# Patient Record
Sex: MALE | Race: WHITE | NOT HISPANIC OR LATINO | Employment: OTHER | URBAN - METROPOLITAN AREA
[De-identification: names, ages, dates, MRNs, and addresses within clinical notes are randomized per-mention and may not be internally consistent; named-entity substitution may affect disease eponyms.]

---

## 2017-03-17 ENCOUNTER — DOCUMENTATION ONLY (OUTPATIENT)
Dept: LAB | Facility: CLINIC | Age: 62
End: 2017-03-17

## 2017-03-17 NOTE — PROGRESS NOTES
.Pt has upcoming lab appointment on 3/22/17. Per lab scrubbing protocol pt is not due for any lab test at this time. Please review chart, if nothing is needed please contact pt and cancel the appointment.  Thank you,  Elsa

## 2017-03-17 NOTE — PROGRESS NOTES
Patient called back and I told him he does not needs labs. He asked about a PSA and I told him to ask Dr Piña when he is in and it can be drawn the day of his physical if needed.Charleen Edward MA/TEODORO

## 2017-03-17 NOTE — PROGRESS NOTES
Left message to call back.  Physical 3/28/17-lab appointment not needed-labs were normal last year  Charleen Edward MA/TC

## 2017-03-28 ENCOUNTER — OFFICE VISIT (OUTPATIENT)
Dept: FAMILY MEDICINE | Facility: CLINIC | Age: 62
End: 2017-03-28
Payer: COMMERCIAL

## 2017-03-28 VITALS
WEIGHT: 196 LBS | BODY MASS INDEX: 29.03 KG/M2 | HEIGHT: 69 IN | DIASTOLIC BLOOD PRESSURE: 76 MMHG | SYSTOLIC BLOOD PRESSURE: 146 MMHG | HEART RATE: 67 BPM | TEMPERATURE: 97.4 F

## 2017-03-28 DIAGNOSIS — Z00.00 ROUTINE GENERAL MEDICAL EXAMINATION AT A HEALTH CARE FACILITY: Primary | ICD-10-CM

## 2017-03-28 DIAGNOSIS — K63.5 SESSILE COLONIC POLYP: ICD-10-CM

## 2017-03-28 DIAGNOSIS — Z12.5 SCREENING FOR PROSTATE CANCER: ICD-10-CM

## 2017-03-28 LAB — PSA SERPL-ACNC: 0.44 UG/L (ref 0–4)

## 2017-03-28 PROCEDURE — G0103 PSA SCREENING: HCPCS | Performed by: FAMILY MEDICINE

## 2017-03-28 PROCEDURE — 99396 PREV VISIT EST AGE 40-64: CPT | Performed by: FAMILY MEDICINE

## 2017-03-28 NOTE — PROGRESS NOTES
SUBJECTIVE:     CC: Gregory Beckham is an 62 year old male who presents for preventative health visit.     Healthy Habits:    Do you get at least three servings of calcium containing foods daily (dairy, green leafy vegetables, etc.)? yes    Amount of exercise or daily activities, outside of work: 4 day(s) per week    Problems taking medications regularly not applicable    Medication side effects: No    Have you had an eye exam in the past two years? no    Do you see a dentist twice per year? yes    Do you have sleep apnea, excessive snoring or daytime drowsiness?no        Would like to discuss colonoscopy results from last year    Today's PHQ-2 Score:   PHQ-2 ( 1999 Pfizer) 3/28/2017 3/9/2016   Q1: Little interest or pleasure in doing things 0 0   Q2: Feeling down, depressed or hopeless 0 0   PHQ-2 Score 0 0       Abuse: Current or Past(Physical, Sexual or Emotional)- No  Do you feel safe in your environment - Yes    Social History   Substance Use Topics     Smoking status: Never Smoker     Smokeless tobacco: Never Used     Alcohol use No     The patient does not drink >3 drinks per day nor >7 drinks per week.    Last PSA:   PSA   Date Value Ref Range Status   03/09/2016 0.42 0 - 4 ug/L Final       Recent Labs   Lab Test  03/09/16   0857 03/29/12   CHOL  147  152   HDL  42  37   LDL  93  93   TRIG  62  112   NHDL  105   --        Reviewed orders with patient. Reviewed health maintenance and updated orders accordingly - Yes    Family history of prostate cancer: No  Last PSA:   PSA   Date Value Ref Range Status   03/09/2016 0.42 0 - 4 ug/L Final     Doing self testicular exam: NO     Family history of colon cancer:No  Last colonscopy: 2015     Family history of CAD:No             Immunizations:    Date last DT 2008,  Date last Pneumovax NA,   Date last Flu NA      Seat Belt:YES   Sunscreen use: YES  Calcium Intake: adeq  Health Care Directive:NO   Sexually Active:NO      Current contraception: none     Current  Concerns: due for colonoscopy due to sessile polyps noted last year          Patient Ed:  Reviewed health maintenance including diet, regular exercise, and periodic exams.     The risks, benefits, and treatment options of prescribed medications or other treatments have been discussed with the patient.  The patient should call or schedule a follow up appt if no improvement or other problems.    Reviewed and updated as needed this visit by clinical staff  Tobacco  Allergies  Meds  Problems  Med Hx  Surg Hx  Fam Hx  Soc Hx          Reviewed and updated as needed this visit by Provider  Allergies  Meds  Problems            ROS:  C: NEGATIVE for fever, chills, change in weight  I: NEGATIVE for worrisome rashes, moles or lesions  E: NEGATIVE for vision changes or irritation  ENT: NEGATIVE for ear, mouth and throat problems  R: NEGATIVE for significant cough or SOB  CV: NEGATIVE for chest pain, palpitations or peripheral edema  GI: NEGATIVE for nausea, abdominal pain, heartburn, or change in bowel habits   male: negative for dysuria, hematuria, decreased urinary stream, erectile dysfunction, urethral discharge  M: NEGATIVE for significant arthralgias or myalgia  N: NEGATIVE for weakness, dizziness or paresthesias  P: NEGATIVE for changes in mood or affect    Problem list, Medication list, Allergies, and Medical/Social/Surgical histories reviewed in [facility] and updated as appropriate.  Labs reviewed in EPIC  BP Readings from Last 3 Encounters:   03/28/17 146/76   03/29/16 126/82   03/09/16 135/79    Wt Readings from Last 3 Encounters:   03/28/17 196 lb (88.9 kg)   03/22/16 188 lb (85.3 kg)   03/09/16 197 lb (89.4 kg)                  Patient Active Problem List   Diagnosis     Advanced directives, counseling/discussion     CARDIOVASCULAR SCREENING; LDL GOAL LESS THAN 160     Sessile colonic polyp     Past Surgical History:   Procedure Laterality Date     BACK SURGERY  1976    cyst on spine      COLONOSCOPY WITH CO2  "INSUFFLATION N/A 3/29/2016    Procedure: COLONOSCOPY WITH CO2 INSUFFLATION;  Surgeon: Jose Alberto Christy MD;  Location: [facility] OR     GENITOURINARY SURGERY      undescened testicle     HEMORRHOIDECTOMY  1996       Social History   Substance Use Topics     Smoking status: Never Smoker     Smokeless tobacco: Never Used     Alcohol use No     Family History   Problem Relation Age of Onset     Alzheimer Disease Mother      Alcohol/Drug Father      CEREBROVASCULAR DISEASE Father      CEREBROVASCULAR DISEASE Paternal Grandfather          Current Outpatient Prescriptions   Medication Sig Dispense Refill     VITAMIN A PO        vitamin  B complex with vitamin C (VITAMIN  B COMPLEX) TABS Take 1 tablet by mouth daily       Ascorbic Acid (VITAMIN C PO)        Cholecalciferol (VITAMIN D3 PO) Take by mouth daily       aspirin 81 MG tablet Take by mouth daily       OBJECTIVE:     /76  Pulse 67  Temp 97.4  F (36.3  C) (Oral)  Ht 5' 8.5\" (1.74 m)  Wt 196 lb (88.9 kg)  BMI 29.36 kg/m2  EXAM:  GENERAL: healthy, alert and no distress  EYES: Eyes grossly normal to inspection, PERRL and conjunctivae and sclerae normal  HENT: ear canals and TM's normal, nose and mouth without ulcers or lesions  NECK: no adenopathy, no asymmetry, masses, or scars and thyroid normal to palpation  RESP: lungs clear to auscultation - no rales, rhonchi or wheezes  CV: regular rate and rhythm, normal S1 S2, no S3 or S4, no murmur, click or rub, no peripheral edema and peripheral pulses strong  ABDOMEN: soft, nontender, no hepatosplenomegaly, no masses and bowel sounds normal   (male): normal male genitalia without lesions or urethral discharge, no hernia  MS: no gross musculoskeletal defects noted, no edema  SKIN: no suspicious lesions or rashes  NEURO: Normal strength and tone, mentation intact and speech normal  PSYCH: mentation appears normal, affect normal/bright    ASSESSMENT/PLAN:     (Z00.00) Routine general medical examination at [facility]" "care facility  (primary encounter diagnosis)  Comment: preventive needs reviewed  Plan: see orders in Epic.   Elevated bp, f/u for repeat testing in pharmacy in 2-4 weeks.  Consider med if still above 140/90, discussed rational for treatment with pt.    (K63.5) Sessile colonic polyp  Comment: due   Plan: GASTROENTEROLOGY ADULT REF PROCEDURE ONLY            (Z12.5) Screening for prostate cancer  Comment: normal last year but pt has relative that is dying of prostate cancer and would like reassurance  Plan: PSA, screen        If normal today, will delay next testing for 2-3 years.      COUNSELING:  Reviewed preventive health counseling, as reflected in patient instructions  Special attention given to:        Regular exercise       Healthy diet/nutrition       Prostate cancer screening    BP Screening:   Last 3 BP Readings:    BP Readings from Last 3 Encounters:   03/28/17 146/76   03/29/16 126/82   03/09/16 135/79       The following was recommended to the patient:  Re-screen within 4 weeks and recommend lifestyle modifications     reports that he has never smoked. He has never used smokeless tobacco.    Estimated body mass index is 29.36 kg/(m^2) as calculated from the following:    Height as of this encounter: 5' 8.5\" (1.74 m).    Weight as of this encounter: 196 lb (88.9 kg).   Weight management plan: Discussed healthy diet and exercise guidelines and patient will follow up in 6 months in clinic to re-evaluate.    Counseling Resources:  ATP IV Guidelines  Pooled Cohorts Equation Calculator  FRAX Risk Assessment  ICSI Preventive Guidelines  Dietary Guidelines for Americans, 2010  USDA's MyPlate  ASA Prophylaxis  Lung CA Screening    Gege Piña MD  [facility]  "

## 2017-03-28 NOTE — PATIENT INSTRUCTIONS
Expect a phone call to schedule colonoscopy.    Blood pressure check in 2 weeks, walk-in to [facility] and request report be sent to Gege Piña MD     Preventive Health Recommendations  Male Ages 50 - 64    Yearly exam:             See your health care provider every year in order to  o   Review health changes.   o   Discuss preventive care.    o   Review your medicines if your doctor has prescribed any.     Have a cholesterol test every 5 years, or more frequently if you are at risk for high cholesterol/heart disease.     Have a diabetes test (fasting glucose) every three years. If you are at risk for diabetes, you should have this test more often.     Have a colonoscopy at age 50, or have a yearly FIT test (stool test). These exams will check for colon cancer.      Talk with your health care provider about whether or not a prostate cancer screening test (PSA) is right for you.    You should be tested each year for STDs (sexually transmitted diseases), if you re at risk.     Shots: Get a flu shot each year. Get a tetanus shot every 10 years.     Nutrition:    Eat at least 5 servings of fruits and vegetables daily.     Eat whole-grain bread, whole-wheat pasta and brown rice instead of white grains and rice.     Talk to your provider about Calcium and Vitamin D.     Lifestyle    Exercise for at least 150 minutes a week (30 minutes a day, 5 days a week). This will help you control your weight and prevent disease.     Limit alcohol to one drink per day.     No smoking.     Wear sunscreen to prevent skin cancer.     See your dentist every six months for an exam and cleaning.     See your eye doctor every 1 to 2 years.    Preventive Health Recommendations  Male Ages 50 - 64    Yearly exam:             See your health care provider every year in order to  o   Review health changes.   o   Discuss preventive care.    o   Review your medicines if your doctor has prescribed any.     Have a cholesterol test every  5 years, or more frequently if you are at risk for high cholesterol/heart disease.     Have a diabetes test (fasting glucose) every three years. If you are at risk for diabetes, you should have this test more often.     Have a colonoscopy at age 50, or have a yearly FIT test (stool test). These exams will check for colon cancer.      Talk with your health care provider about whether or not a prostate cancer screening test (PSA) is right for you.    You should be tested each year for STDs (sexually transmitted diseases), if you re at risk.     Shots: Get a flu shot each year. Get a tetanus shot every 10 years.     Nutrition:    Eat at least 5 servings of fruits and vegetables daily.     Eat whole-grain bread, whole-wheat pasta and brown rice instead of white grains and rice.     Talk to your provider about Calcium and Vitamin D.     Lifestyle    Exercise for at least 150 minutes a week (30 minutes a day, 5 days a week). This will help you control your weight and prevent disease.     Limit alcohol to one drink per day.     No smoking.     Wear sunscreen to prevent skin cancer.     See your dentist every six months for an exam and cleaning.     See your eye doctor every 1 to 2 years.

## 2017-03-28 NOTE — PROGRESS NOTES
"  SUBJECTIVE:     CC: Gregory Beckham is an 62 year old male who presents for preventative health visit.     Healthy Habits:    Do you get at least three servings of calcium containing foods daily (dairy, green leafy vegetables, etc.)? {YES/NO, DAIRY INTAKE:168417::\"yes\"}    Amount of exercise or daily activities, outside of work: {AMOUNT EXERCISE:467903}    Problems taking medications regularly {Yes /No default:762596::\"No\"}    Medication side effects: {Yes /No default.:220232::\"No\"}    Have you had an eye exam in the past two years? {YESNOBLANK:590322}    Do you see a dentist twice per year? {YESNOBLANK:662476}    Do you have sleep apnea, excessive snoring or daytime drowsiness?{YESNOBLANK:776403}    {Outside tests to abstract? :219505}    {additional problems to add:559970}    Today's PHQ-2 Score:   PHQ-2 ( 1999 Pfizer) 3/9/2016 7/23/2014   Q1: Little interest or pleasure in doing things 0 0   Q2: Feeling down, depressed or hopeless 0 0   PHQ-2 Score 0 0     {PHQ-2 LOOK IN ASSESSMENTS :598010}  Abuse: Current or Past(Physical, Sexual or Emotional)- {YES/NO/NA:296772}  Do you feel safe in your environment - {YES/NO/NA:140658}    Social History   Substance Use Topics     Smoking status: Never Smoker     Smokeless tobacco: Never Used     Alcohol use No     {ETOH AUDIT:518352}    Last PSA:   PSA   Date Value Ref Range Status   03/09/2016 0.42 0 - 4 ug/L Final       Recent Labs   Lab Test  03/09/16   0857 03/29/12   CHOL  147  152   HDL  42  37   LDL  93  93   TRIG  62  112   NHDL  105   --        Reviewed orders with patient. Reviewed health maintenance and updated orders accordingly - {Yes/No:339219::\"Yes\"}      Family history of prostate cancer: No  Last PSA:   PSA   Date Value Ref Range Status   03/09/2016 0.42 0 - 4 ug/L Final     Doing self testicular exam: NO     Family history of colon cancer:No  Last colonscopy: 3/16     Family history of CAD:No       Immunizations:    Date last DT tdap 2008,  Date last " "Pneumovax NA   Date last Flu due       Seat Belt:{YES/NO (DEFAULT IS YES):146395::\"YES\"}   Sunscreen use: {YES/NO (DEFAULT IS YES):179362::\"YES\"}  Calcium Intake: adeq   Health Care Directive:NO   Sexually Active:NO      Current contraception: none     Current Concerns: ***          Patient Ed:  Reviewed health maintenance including diet, regular exercise, and periodic exams.     The risks, benefits, and treatment options of prescribed medications or other treatments have been discussed with the patient.  The patient should call or schedule a follow up appt if no improvement or other problems.    Reviewed and updated as needed this visit by clinical staff         Reviewed and updated as needed this visit by Provider        {HISTORY OPTIONS:855943}    ROS:  {MALE ROS-adult preventive care package:923308::\"C: NEGATIVE for fever, chills, change in weight\",\"I: NEGATIVE for worrisome rashes, moles or lesions\",\"E: NEGATIVE for vision changes or irritation\",\"ENT: NEGATIVE for ear, mouth and throat problems\",\"R: NEGATIVE for significant cough or SOB\",\"CV: NEGATIVE for chest pain, palpitations or peripheral edema\",\"GI: NEGATIVE for nausea, abdominal pain, heartburn, or change in bowel habits\",\" male: negative for dysuria, hematuria, decreased urinary stream, erectile dysfunction, urethral discharge\",\"M: NEGATIVE for significant arthralgias or myalgia\",\"N: NEGATIVE for weakness, dizziness or paresthesias\",\"P: NEGATIVE for changes in mood or affect\"}    {CHRONICPROBDATA:282561}  OBJECTIVE:     There were no vitals taken for this visit.  EXAM:  {Exam Choices:747504}    ASSESSMENT/PLAN:     {Diag Picklist:984626}    COUNSELING:  {MALE COUNSELING MESSAGES:979598::\"Reviewed preventive health counseling, as reflected in patient instructions\"}    {Blood Pressure - Adult Preventive:174953}     reports that he has never smoked. He has never used smokeless tobacco.  {Tobacco Cessation needed for ACO -- Delete if patient is a " "non-smoker:392807}  Estimated body mass index is 28.59 kg/(m^2) as calculated from the following:    Height as of 3/22/16: 5' 8\" (1.727 m).    Weight as of 3/22/16: 188 lb (85.3 kg).   {Weight Management Plan needed for ACO:453923}    Counseling Resources:  ATP IV Guidelines  Pooled Cohorts Equation Calculator  FRAX Risk Assessment  ICSI Preventive Guidelines  Dietary Guidelines for Americans, 2010  USDA's MyPlate  ASA Prophylaxis  Lung CA Screening    Gege Piña MD  [facility]  "

## 2017-03-28 NOTE — LETTER
[facility]  [address] [address] [phone]        March 28, 2017    Gregory Beckham  [address] [address]            Dear Gregory,    Your prostate testing is nearly unchanged from 1 year ago.  I suggest not checking again for about 3 years or if you have symptoms.     Gege Piña MD/kinjal    Results for orders placed or performed in visit on 03/28/17   PSA, screen   Result Value Ref Range    PSA 0.44 0 - 4 ug/L

## 2017-03-28 NOTE — MR AVS SNAPSHOT
After Visit Summary   3/28/2017    Gregory Beckham    MRN: [id number]           Patient Information     Date Of Birth          1955        Visit Information        Provider Department      3/28/2017 7:35 AM Gege Piña MD [facility] [place]        Today's Diagnoses     Routine general medical examination at a health care facility    -  1    Sessile colonic polyp        Screening for prostate cancer          Care Instructions        Expect a phone call to schedule colonoscopy.    Blood pressure check in 2 weeks, walk-in to [facility] and request report be sent to Gege Piña MD     Preventive Health Recommendations  Male Ages 50 - 64    Yearly exam:             See your health care provider every year in order to  o   Review health changes.   o   Discuss preventive care.    o   Review your medicines if your doctor has prescribed any.     Have a cholesterol test every 5 years, or more frequently if you are at risk for high cholesterol/heart disease.     Have a diabetes test (fasting glucose) every three years. If you are at risk for diabetes, you should have this test more often.     Have a colonoscopy at age 50, or have a yearly FIT test (stool test). These exams will check for colon cancer.      Talk with your health care provider about whether or not a prostate cancer screening test (PSA) is right for you.    You should be tested each year for STDs (sexually transmitted diseases), if you re at risk.     Shots: Get a flu shot each year. Get a tetanus shot every 10 years.     Nutrition:    Eat at least 5 servings of fruits and vegetables daily.     Eat whole-grain bread, whole-wheat pasta and brown rice instead of white grains and rice.     Talk to your provider about Calcium and Vitamin D.     Lifestyle    Exercise for at least 150 minutes a week (30 minutes a day, 5 days a week). This will help you control your weight and prevent disease.     Limit alcohol to one drink  per day.     No smoking.     Wear sunscreen to prevent skin cancer.     See your dentist every six months for an exam and cleaning.     See your eye doctor every 1 to 2 years.    Preventive Health Recommendations  Male Ages 50 - 64    Yearly exam:             See your health care provider every year in order to  o   Review health changes.   o   Discuss preventive care.    o   Review your medicines if your doctor has prescribed any.     Have a cholesterol test every 5 years, or more frequently if you are at risk for high cholesterol/heart disease.     Have a diabetes test (fasting glucose) every three years. If you are at risk for diabetes, you should have this test more often.     Have a colonoscopy at age 50, or have a yearly FIT test (stool test). These exams will check for colon cancer.      Talk with your health care provider about whether or not a prostate cancer screening test (PSA) is right for you.    You should be tested each year for STDs (sexually transmitted diseases), if you re at risk.     Shots: Get a flu shot each year. Get a tetanus shot every 10 years.     Nutrition:    Eat at least 5 servings of fruits and vegetables daily.     Eat whole-grain bread, whole-wheat pasta and brown rice instead of white grains and rice.     Talk to your provider about Calcium and Vitamin D.     Lifestyle    Exercise for at least 150 minutes a week (30 minutes a day, 5 days a week). This will help you control your weight and prevent disease.     Limit alcohol to one drink per day.     No smoking.     Wear sunscreen to prevent skin cancer.     See your dentist every six months for an exam and cleaning.     See your eye doctor every 1 to 2 years.          Follow-ups after your visit        Additional Services     GASTROENTEROLOGY ADULT REF PROCEDURE ONLY       Last Lab Result: No results found for: CR  Body mass index is 29.36 kg/(m^2).     Needed:  No  Language:  English    Patient will be contacted to  "schedule procedure.     Please be aware that coverage of these services is subject to the terms and limitations of your health insurance plan.  Call member services at your health plan with any benefit or coverage questions.  Any procedures must be performed at a [facility] facility OR coordinated by your clinic's referral office.    Please bring the following with you to your appointment:    (1) Any X-Rays, CTs or MRIs which have been performed.  Contact the facility where they were done to arrange for  prior to your scheduled appointment.    (2) List of current medications   (3) This referral request   (4) Any documents/labs given to you for this referral                  Who to contact     If you have questions or need follow up information about today's clinic visit or your schedule please contact [facility] directly at [phone].  Normal or non-critical lab and imaging results will be communicated to you by MyChart, letter or phone within 4 business days after the clinic has received the results. If you do not hear from us within 7 days, please contact the clinic through MyChart or phone. If you have a critical or abnormal lab result, we will notify you by phone as soon as possible.  Submit refill requests through [employer] or call your pharmacy and they will forward the refill request to us. Please allow 3 business days for your refill to be completed.          Additional Information About Your Visit        [employer] Information     [employer] lets you send messages to your doctor, view your test results, renew your prescriptions, schedule appointments and more. To sign up, go to [email or web address] . Click on \"Log in\" on the left side of the screen, which will take you to the Welcome page. Then click on \"Sign up Now\" on the right side of the page.     You will be asked to enter the access code listed below, as well as some personal information. Please follow the directions to create your " "username and password.     Your access code is: [email or web address]  Expires: 2017  7:59 AM     Your access code will  in 90 days. If you need help or a new code, please call your [facility] clinic or [phone].        Care EveryWhere ID     This is your Care EveryWhere ID. This could be used by other organizations to access your [facility] medical records  [id number]        Your Vitals Were     Pulse Temperature Height BMI (Body Mass Index)          67 97.4  F (36.3  C) (Oral) 5' 8.5\" (1.74 m) 29.36 kg/m2         Blood Pressure from Last 3 Encounters:   17 154/89   16 126/82   16 135/79    Weight from Last 3 Encounters:   17 196 lb (88.9 kg)   16 188 lb (85.3 kg)   16 197 lb (89.4 kg)              We Performed the Following     GASTROENTEROLOGY ADULT REF PROCEDURE ONLY     PSA, screen        Primary Care Provider Office Phone # Fax #    [facility] [phone] [address] [facility]. [facility] [address]        Thank you!     Thank you for choosing [facility]  for your care. Our goal is always to provide you with excellent care. Hearing back from our patients is one way we can continue to improve our services. Please take a few minutes to complete the written survey that you may receive in the mail after your visit with us. Thank you!             Your Updated Medication List - Protect others around you: Learn how to safely use, store and throw away your medicines at www.disposemymeds.org.          This list is accurate as of: 3/28/17  7:59 AM.  Always use your most recent med list.                   Brand Name Dispense Instructions for use    aspirin 81 MG tablet      Take by mouth daily       VITAMIN A PO          vitamin B complex with vitamin C Tabs tablet      Take 1 tablet by mouth daily       VITAMIN C PO          VITAMIN D3 PO      Take by mouth daily         "

## 2017-03-28 NOTE — NURSING NOTE
"Chief Complaint   Patient presents with     Physical       Initial /89  Pulse 67  Temp 97.4  F (36.3  C) (Oral)  Ht 5' 8.5\" (1.74 m)  Wt 196 lb (88.9 kg)  BMI 29.36 kg/m2 Estimated body mass index is 29.36 kg/(m^2) as calculated from the following:    Height as of this encounter: 5' 8.5\" (1.74 m).    Weight as of this encounter: 196 lb (88.9 kg).  Medication Reconciliation: complete  [facility] , [place]     "

## 2017-04-17 ENCOUNTER — SURGERY (OUTPATIENT)
Age: 62
End: 2017-04-17

## 2017-04-17 ENCOUNTER — HOSPITAL ENCOUNTER (OUTPATIENT)
Facility: AMBULATORY SURGERY CENTER | Age: 62
Discharge: HOME | End: 2017-04-17
Attending: SURGERY | Admitting: SURGERY
Payer: COMMERCIAL

## 2017-04-17 VITALS
BODY MASS INDEX: 28.79 KG/M2 | TEMPERATURE: 98.9 F | HEIGHT: 68 IN | DIASTOLIC BLOOD PRESSURE: 84 MMHG | OXYGEN SATURATION: 96 % | SYSTOLIC BLOOD PRESSURE: 121 MMHG | RESPIRATION RATE: 14 BRPM | WEIGHT: 190 LBS

## 2017-04-17 DIAGNOSIS — K21.00 GASTROESOPHAGEAL REFLUX DISEASE WITH ESOPHAGITIS: Primary | ICD-10-CM

## 2017-04-17 LAB — COLONOSCOPY: NORMAL

## 2017-04-17 PROCEDURE — 45385 COLONOSCOPY W/LESION REMOVAL: CPT

## 2017-04-17 PROCEDURE — 45385 COLONOSCOPY W/LESION REMOVAL: CPT | Mod: PT | Performed by: SURGERY

## 2017-04-17 PROCEDURE — G8907 PT DOC NO EVENTS ON DISCHARG: HCPCS

## 2017-04-17 PROCEDURE — G8918 PT W/O PREOP ORDER IV AB PRO: HCPCS

## 2017-04-17 PROCEDURE — 88305 TISSUE EXAM BY PATHOLOGIST: CPT | Mod: TC | Performed by: SURGERY

## 2017-04-17 RX ORDER — FENTANYL CITRATE 50 UG/ML
INJECTION, SOLUTION INTRAMUSCULAR; INTRAVENOUS PRN
Status: DISCONTINUED | OUTPATIENT
Start: 2017-04-17 | End: 2017-04-17 | Stop reason: HOSPADM

## 2017-04-17 RX ORDER — PANTOPRAZOLE SODIUM 40 MG/1
40 TABLET, DELAYED RELEASE ORAL 2 TIMES DAILY
Qty: 90 TABLET | Refills: 1 | Status: SHIPPED | OUTPATIENT
Start: 2017-04-17 | End: 2017-04-17

## 2017-04-17 RX ORDER — ONDANSETRON 2 MG/ML
4 INJECTION INTRAMUSCULAR; INTRAVENOUS
Status: DISCONTINUED | OUTPATIENT
Start: 2017-04-17 | End: 2017-04-18 | Stop reason: HOSPADM

## 2017-04-17 RX ORDER — LIDOCAINE 40 MG/G
CREAM TOPICAL
Status: DISCONTINUED | OUTPATIENT
Start: 2017-04-17 | End: 2017-04-18 | Stop reason: HOSPADM

## 2017-04-17 RX ADMIN — FENTANYL CITRATE 50 MCG: 50 INJECTION, SOLUTION INTRAMUSCULAR; INTRAVENOUS at 09:54

## 2017-04-17 RX ADMIN — FENTANYL CITRATE 50 MCG: 50 INJECTION, SOLUTION INTRAMUSCULAR; INTRAVENOUS at 09:56

## 2017-04-17 RX ADMIN — FENTANYL CITRATE 100 MCG: 50 INJECTION, SOLUTION INTRAMUSCULAR; INTRAVENOUS at 09:38

## 2017-04-20 LAB — COPATH REPORT: NORMAL

## 2024-07-05 ENCOUNTER — OFFICE VISIT (OUTPATIENT)
Dept: FAMILY MEDICINE | Facility: CLINIC | Age: 69
End: 2024-07-05
Payer: COMMERCIAL

## 2024-07-05 VITALS
RESPIRATION RATE: 16 BRPM | BODY MASS INDEX: 32.28 KG/M2 | SYSTOLIC BLOOD PRESSURE: 162 MMHG | HEIGHT: 68 IN | WEIGHT: 213 LBS | OXYGEN SATURATION: 98 % | DIASTOLIC BLOOD PRESSURE: 94 MMHG | TEMPERATURE: 97 F | HEART RATE: 73 BPM

## 2024-07-05 DIAGNOSIS — G45.9 TIA (TRANSIENT ISCHEMIC ATTACK): ICD-10-CM

## 2024-07-05 DIAGNOSIS — Z12.11 COLON CANCER SCREENING: ICD-10-CM

## 2024-07-05 DIAGNOSIS — I10 BENIGN ESSENTIAL HYPERTENSION: ICD-10-CM

## 2024-07-05 DIAGNOSIS — I65.21 STENOSIS OF RIGHT CAROTID ARTERY: ICD-10-CM

## 2024-07-05 DIAGNOSIS — Z09 HOSPITAL DISCHARGE FOLLOW-UP: Primary | ICD-10-CM

## 2024-07-05 PROCEDURE — 99204 OFFICE O/P NEW MOD 45 MIN: CPT | Performed by: FAMILY MEDICINE

## 2024-07-05 RX ORDER — VITAMIN E 268 MG
400 CAPSULE ORAL
COMMUNITY

## 2024-07-05 RX ORDER — CLOPIDOGREL BISULFATE 75 MG/1
75 TABLET ORAL
COMMUNITY
Start: 2024-07-01 | End: 2024-09-05

## 2024-07-05 RX ORDER — AMLODIPINE BESYLATE 5 MG/1
1 TABLET ORAL DAILY
COMMUNITY
Start: 2024-07-02 | End: 2024-07-05

## 2024-07-05 RX ORDER — ATORVASTATIN CALCIUM 40 MG/1
40 TABLET, FILM COATED ORAL DAILY
COMMUNITY
Start: 2024-07-01

## 2024-07-05 RX ORDER — AMLODIPINE BESYLATE 10 MG/1
10 TABLET ORAL DAILY
Qty: 90 TABLET | Refills: 1 | Status: SHIPPED | OUTPATIENT
Start: 2024-07-05

## 2024-07-05 ASSESSMENT — PAIN SCALES - GENERAL: PAINLEVEL: NO PAIN (0)

## 2024-07-05 NOTE — PROGRESS NOTES
"  Assessment & Plan     Hospital discharge follow-up  Gregory Beckham is a 69 year old male who presents today for follow-up after recent discharge from hospital.  The patient was admitted for TIA.  He was found to have severe right carotid artery stenosis status post right carotid endarterectomy.  Today he reports he is doing well.  He was discharged on Plavix, aspirin, and statin.  He will continue the same medication and follow-up with vascular surgery and neurology.  TIA (transient ischemic attack)  Continue statin, Lipitor and aspirin.  Follow-up with neurology as planned  Stenosis of right carotid artery  He was found to have severe right carotid artery stenosis status post right carotid endarterectomy  Benign essential hypertension  Blood pressures not well-controlled.  Will increase Norvasc to 10 mg.  - amLODIPine (NORVASC) 10 MG tablet; Take 1 tablet (10 mg) by mouth daily    Colon cancer screening    - Colonoscopy Screening  Referral; Future        MED REC REQUIRED  Post Medication Reconciliation Status: discharge medications reconciled and changed, per note/orders  BMI  Estimated body mass index is 32.39 kg/m  as calculated from the following:    Height as of this encounter: 1.727 m (5' 8\").    Weight as of this encounter: 96.6 kg (213 lb).   Weight management plan: Discussed healthy diet and exercise guidelines      Work on weight loss  Regular exercise    Fabien Jenkins is a 69 year old, presenting for the following health issues:  Hospital F/U        7/5/2024    10:42 AM   Additional Questions   Roomed by Noa HERNÁNDEZ         Hospital Follow-up Visit:    Hospital[facility]Nursing Home/ Rehab [facility]   Date of Admission: 6/28/24  Date of Discharge: 7/1/24  Reason(s) for Admission: TIA (transient ischemic attack) (Primary Dx);   Internal carotid artery stenosis, right;   HTN (hypertension)   Was the patient in the ICU or did the patient experience delirium during hospitalization?  " No  Do you have any other stressors you would like to discuss with your provider? No    Problems taking medications regularly:  None  Medication changes since discharge: None  Problems adhering to non-medication therapy:  None    Summary of hospitalization:  CareEverywhere information obtained and reviewed  Diagnostic Tests/Treatments reviewed.  Follow up needed: none  Other Healthcare Providers Involved in Patient s Care:         None  Update since discharge: improved.         Plan of care communicated with patient         Hospital Summary     Gregory Beckham is a(n) 69 y.o. , who was admitted on 2024 with right eye vision changes and dizziness due to transient ischemic attack. Stroke Neurology called from emergency department . No medical care in 4 years and his wife  3 years ago. Patient having intermittent issues feeling like a dark shade is being pulled down on his eye did see an eye professional workup was negative he was recommended to come to the ED this was a few days ago now presents today. He was told at the eye provider that he had low blood flow to the right eye. Patient had an MRI showing carotid stenosis. Patient currently saying symptoms are improved but he has been feeling dizzy with ambulation. Neurology was consulted from the emergency room anticoagulation plan per them.      CAT neck :  FINDINGS:   RIGHT CAROTID: 90% stenosis of the proximal right internal carotid artery is present. Lesion occurs less than 4 mm distal to the ICA origin. Small ulceration along the proximal aspect of the stenotic segment. No dissection. Findings are concurrent with the carotid MRA exam.     LEFT CAROTID: Mild stenosis of the left ICA origin. Calcified plaque. 15% narrowing. No dissection.     MR soft tissue neck  1.  Normal MRA [place] [facility] [place].     NECK MRA:   1.  Very high-grade focal stenosis of the proximal right internal carotid artery. Ulcerated plaque.     2.  Mild stenosis at left carotid  "bifurcation.       SH:    Marital status:  wife  3 years ago   Kids: no   Employment:    Exercise: yes   Tobacco: no - quit 40 years ago   Etoh: no quit 30 years             Review of Systems  Constitutional, HEENT, cardiovascular, pulmonary, gi and gu systems are negative, except as otherwise noted.      Objective    BP (!) 162/94   Pulse 73   Temp 97  F (36.1  C) (Tympanic)   Resp 16   Ht 1.727 m (5' 8\")   Wt 96.6 kg (213 lb)   SpO2 98%   BMI 32.39 kg/m    Body mass index is 32.39 kg/m .  Physical Exam  Vitals and nursing note reviewed.   Constitutional:       General: He is not in acute distress.     Appearance: Normal appearance. He is obese. He is not ill-appearing, toxic-appearing or diaphoretic.   HENT:      Head: Normocephalic and atraumatic.   Neck:     Cardiovascular:      Rate and Rhythm: Normal rate and regular rhythm.      Heart sounds: No murmur heard.     No friction rub. No gallop.   Pulmonary:      Effort: Pulmonary effort is normal. No respiratory distress.      Breath sounds: Normal breath sounds. No stridor. No wheezing, rhonchi or rales.   Chest:      Chest wall: No tenderness.   Musculoskeletal:         General: No swelling, tenderness, deformity or signs of injury.      Cervical back: Normal range of motion. No rigidity or tenderness.   Skin:     General: Skin is warm.                    Signed Electronically by: Dacia Paredes MD    "

## 2024-07-11 ENCOUNTER — TELEPHONE (OUTPATIENT)
Dept: GASTROENTEROLOGY | Facility: CLINIC | Age: 69
End: 2024-07-11
Payer: COMMERCIAL

## 2024-07-11 NOTE — TELEPHONE ENCOUNTER
"Endoscopy Scheduling Screen    Have you had a positive Covid test in the last 14 days?  No    What is your communication preference for Instructions and/or Bowel Prep?   MyChart    What insurance is in the chart?  Other:  [facility]    Ordering/Referring Provider:   VIOLET DUMONT        (If ordering provider performs procedure, schedule with ordering provider unless otherwise instructed. )    BMI: Estimated body mass index is 32.39 kg/m  as calculated from the following:    Height as of 7/5/24: 1.727 m (5' 8\").    Weight as of 7/5/24: 96.6 kg (213 lb).     Sedation Ordered  moderate sedation.   If patient BMI > 50 do not schedule in [facility].    If patient BMI > 45 do not schedule at [facility].    Are you taking methadone or Suboxone?  No    Have you had difficulties, pain, or discomfort during past endoscopy procedures?  No    Are you taking any prescription medications for pain 3 or more times per week?   NO, No RN review required.    Do you have a history of malignant hyperthermia?  No    (Females) Are you currently pregnant?   No     Have you been diagnosed or told you have pulmonary hypertension?   No    Do you have an LVAD?  No    Have you been told you have moderate to severe sleep apnea?  No    Have you been told you have COPD, asthma, or any other lung disease?  No    Do you have any heart conditions?  No     Have you ever had or are you waiting for an organ transplant?  No. Continue scheduling, no site restrictions.    Have you had a stroke or transient ischemic attack (TIA aka \"mini stroke\" in the last 6 months?   Yes (RN Review required for scheduling.) 6/28/24 TIA    Have you been diagnosed with or been told you have cirrhosis of the liver?   No    Are you currently on dialysis?   No    Do you need assistance transferring?   No    BMI: Estimated body mass index is 32.39 kg/m  as calculated from the following:    Height as of 7/5/24: 1.727 m (5' 8\").    Weight as of 7/5/24: 96.6 kg (213 lb).     Is patients BMI > 40 " and scheduling location [facility]?  No    Do you take an injectable medication for weight loss or diabetes (excluding insulin)?  No    Do you take the medication Naltrexone?  No    Do you take blood thinners?  Yes     Are you taking Effient/Prasugrel?  Yes, please follow up with your ordering provider for bridging instructions. (Schedule more than 14 days out and notify pre-assessment RN via In Basket)       Prep   Are you currently on dialysis or do you have chronic kidney disease?  No    Do you have a diagnosis of diabetes?  No    Do you have a diagnosis of cystic fibrosis (CF)?  No    On a regular basis do you go 3 -5 days between bowel movements?  No    BMI > 40?  No    Preferred Pharmacy:        [id number] - [place], [place] - [address] [facility]  [address] [facility] [address]  Phone: [phone] Fax: [phone]      Final Scheduling Details     Procedure scheduled  Colonoscopy    Surgeon:  Kwaku     Date of procedure:  1/3/25     Pre-OP / PAC:   No - Not required for this site.    Location  [facility] - [facility] - Patient preference.    Sedation   Moderate Sedation - Per order.      Patient Reminders:   You will receive a call from a Nurse to review instructions and health history.  This assessment must be completed prior to your procedure.  Failure to complete the Nurse assessment may result in the procedure being cancelled.      On the day of your procedure, please designate an adult(s) who can drive you home stay with you for the next 24 hours. The medicines used in the exam will make you sleepy. You will not be able to drive.      You cannot take public transportation, ride share services, or non-medical taxi service without a responsible caregiver.  Medical transport services are allowed with the requirement that a responsible caregiver will receive you at your destination.  We require that drivers and caregivers are confirmed prior to your procedure.

## 2024-07-11 NOTE — TELEPHONE ENCOUNTER
Pre Assessment RN Review    Focused Assessments    Stroke/TIA Review    Patient has hx of TIA/Stroke in the last 6 months.       Scheduling Status & Recommendations    Delay scheduling until after 12/28/24 - 6 months after TIA date of 6/28/24.    Danya Crowell RN Colorectal Cancer   Division of Gastroenterology at [facility]

## 2024-07-17 ENCOUNTER — TRANSFERRED RECORDS (OUTPATIENT)
Dept: HEALTH INFORMATION MANAGEMENT | Facility: CLINIC | Age: 69
End: 2024-07-17
Payer: COMMERCIAL

## 2024-09-05 ENCOUNTER — OFFICE VISIT (OUTPATIENT)
Dept: FAMILY MEDICINE | Facility: CLINIC | Age: 69
End: 2024-09-05
Payer: COMMERCIAL

## 2024-09-05 VITALS
HEIGHT: 68 IN | WEIGHT: 208 LBS | DIASTOLIC BLOOD PRESSURE: 85 MMHG | SYSTOLIC BLOOD PRESSURE: 165 MMHG | OXYGEN SATURATION: 97 % | TEMPERATURE: 97 F | HEART RATE: 73 BPM | BODY MASS INDEX: 31.52 KG/M2 | RESPIRATION RATE: 16 BRPM

## 2024-09-05 DIAGNOSIS — I10 BENIGN ESSENTIAL HYPERTENSION: ICD-10-CM

## 2024-09-05 DIAGNOSIS — Z00.00 ENCOUNTER FOR MEDICARE ANNUAL WELLNESS EXAM: Primary | ICD-10-CM

## 2024-09-05 DIAGNOSIS — Z12.5 SCREENING FOR PROSTATE CANCER: ICD-10-CM

## 2024-09-05 LAB
ALBUMIN SERPL BCG-MCNC: 4.5 G/DL (ref 3.5–5.2)
ALP SERPL-CCNC: 86 U/L (ref 40–150)
ALT SERPL W P-5'-P-CCNC: 40 U/L (ref 0–70)
ANION GAP SERPL CALCULATED.3IONS-SCNC: 9 MMOL/L (ref 7–15)
AST SERPL W P-5'-P-CCNC: 30 U/L (ref 0–45)
BILIRUB SERPL-MCNC: 0.5 MG/DL
BUN SERPL-MCNC: 12.8 MG/DL (ref 8–23)
CALCIUM SERPL-MCNC: 9.6 MG/DL (ref 8.8–10.4)
CHLORIDE SERPL-SCNC: 105 MMOL/L (ref 98–107)
CHOLEST SERPL-MCNC: 104 MG/DL
CREAT SERPL-MCNC: 1.01 MG/DL (ref 0.67–1.17)
EGFRCR SERPLBLD CKD-EPI 2021: 80 ML/MIN/1.73M2
FASTING STATUS PATIENT QL REPORTED: YES
FASTING STATUS PATIENT QL REPORTED: YES
GLUCOSE SERPL-MCNC: 109 MG/DL (ref 70–99)
HCO3 SERPL-SCNC: 28 MMOL/L (ref 22–29)
HDLC SERPL-MCNC: 34 MG/DL
LDLC SERPL CALC-MCNC: 57 MG/DL
NONHDLC SERPL-MCNC: 70 MG/DL
POTASSIUM SERPL-SCNC: 5 MMOL/L (ref 3.4–5.3)
PROT SERPL-MCNC: 7 G/DL (ref 6.4–8.3)
PSA SERPL DL<=0.01 NG/ML-MCNC: 0.73 NG/ML (ref 0–6.5)
SODIUM SERPL-SCNC: 142 MMOL/L (ref 135–145)
TRIGL SERPL-MCNC: 65 MG/DL

## 2024-09-05 PROCEDURE — 36415 COLL VENOUS BLD VENIPUNCTURE: CPT | Performed by: FAMILY MEDICINE

## 2024-09-05 PROCEDURE — 80053 COMPREHEN METABOLIC PANEL: CPT | Performed by: FAMILY MEDICINE

## 2024-09-05 PROCEDURE — 99213 OFFICE O/P EST LOW 20 MIN: CPT | Mod: 25 | Performed by: FAMILY MEDICINE

## 2024-09-05 PROCEDURE — 80061 LIPID PANEL: CPT | Mod: GY | Performed by: FAMILY MEDICINE

## 2024-09-05 PROCEDURE — G0103 PSA SCREENING: HCPCS | Performed by: FAMILY MEDICINE

## 2024-09-05 PROCEDURE — G0438 PPPS, INITIAL VISIT: HCPCS | Performed by: FAMILY MEDICINE

## 2024-09-05 ASSESSMENT — PAIN SCALES - GENERAL: PAINLEVEL: NO PAIN (0)

## 2024-09-05 NOTE — PATIENT INSTRUCTIONS
Patient Education   Preventive Care Advice   This is general advice given by our system to help you stay healthy. However, your care team may have specific advice just for you. Please talk to your care team about your preventive care needs.  Nutrition  Eat 5 or more servings of fruits and vegetables each day.  Try wheat bread, brown rice and whole grain pasta (instead of white bread, rice, and pasta).  Get enough calcium and vitamin D. Check the label on foods and aim for 100% of the RDA (recommended daily allowance).  Lifestyle  Exercise at least 150 minutes each week  (30 minutes a day, 5 days a week).  Do muscle strengthening activities 2 days a week. These help control your weight and prevent disease.  No smoking.  Wear sunscreen to prevent skin cancer.  Have a dental exam and cleaning every 6 months.  Yearly exams  See your health care team every year to talk about:  Any changes in your health.  Any medicines your care team has prescribed.  Preventive care, family planning, and ways to prevent chronic diseases.  Shots (vaccines)   HPV shots (up to age 26), if you've never had them before.  Hepatitis B shots (up to age 59), if you've never had them before.  COVID-19 shot: Get this shot when it's due.  Flu shot: Get a flu shot every year.  Tetanus shot: Get a tetanus shot every 10 years.  Pneumococcal, hepatitis A, and RSV shots: Ask your care team if you need these based on your risk.  Shingles shot (for age 50 and up)  General health tests  Diabetes screening:  Starting at age 35, Get screened for diabetes at least every 3 years.  If you are younger than age 35, ask your care team if you should be screened for diabetes.  Cholesterol test: At age 39, start having a cholesterol test every 5 years, or more often if advised.  Bone density scan (DEXA): At age 50, ask your care team if you should have this scan for osteoporosis (brittle bones).  Hepatitis C: Get tested at least once in your life.  STIs (sexually  transmitted infections)  Before age 24: Ask your care team if you should be screened for STIs.  After age 24: Get screened for STIs if you're at risk. You are at risk for STIs (including HIV) if:  You are sexually active with more than one person.  You don't use condoms every time.  You or a partner was diagnosed with a sexually transmitted infection.  If you are at risk for HIV, ask about PrEP medicine to prevent HIV.  Get tested for HIV at least once in your life, whether you are at risk for HIV or not.  Cancer screening tests  Cervical cancer screening: If you have a cervix, begin getting regular cervical cancer screening tests starting at age 21.  Breast cancer scan (mammogram): If you've ever had breasts, begin having regular mammograms starting at age 40. This is a scan to check for breast cancer.  Colon cancer screening: It is important to start screening for colon cancer at age 45.  Have a colonoscopy test every 10 years (or more often if you're at risk) Or, ask your provider about stool tests like a FIT test every year or Cologuard test every 3 years.  To learn more about your testing options, visit:   .  For help making a decision, visit:   https://bit.ly/oe88241.  Prostate cancer screening test: If you have a prostate, ask your care team if a prostate cancer screening test (PSA) at age 55 is right for you.  Lung cancer screening: If you are a current or former smoker ages 50 to 80, ask your care team if ongoing lung cancer screenings are right for you.  For informational purposes only. Not to replace the advice of your health care provider. Copyright   2023 [employer]. All rights reserved. Clinically reviewed by the [facility] Transitions Program. Subject Company 289396 - REV 01/24.

## 2024-09-05 NOTE — PROGRESS NOTES
Preventive Care Visit  [facility]  Dacia Paredes MD, Family Medicine  Sep 5, 2024      Assessment & Plan     Encounter for Medicare annual wellness exam  Preventive care reviewed and updated.  Doing well     Benign essential hypertension   Blood pressure is elevated in clinic, but well-controlled at home.  No change in medication.  Continue Norvasc 10 mg  Recommend low-salt diet.  Continue with Lipitor 40 mg, and baby aspirin due to history of TIA and carotid artery stenosis.  - Lipid panel reflex to direct LDL Fasting; Future  - Comprehensive metabolic panel; Future  - Lipid panel reflex to direct LDL Fasting  - Comprehensive metabolic panel    Screening for prostate cancer    - Prostate Specific Antigen Screen; Future  - Prostate Specific Antigen Screen            Counseling  Appropriate preventive services were addressed with this patient via screening, questionnaire, or discussion as appropriate for fall prevention, nutrition, physical activity, Tobacco-use cessation, social engagement, weight loss and cognition.  Checklist reviewing preventive services available has been given to the patient.  Reviewed patient's diet, addressing concerns and/or questions.       Work on weight loss  Regular exercise    Fabien Jenkins is a 69 year old, presenting for the following:  Medicare Visit (Fasting for labs, will get flu shot at [facility]. )        9/5/2024     9:15 AM   Additional Questions   Roomed by Noa BOWERS   Accompanied by Self       Health Care Directive  Patient does not have a Health Care Directive or Living Will: Discussed advance care planning with patient; however, patient declined at this time.    HPI    Preventive - advised to get Shingrix from our pharmacy.     Immunization History   Administered Date(s) Administered    COVID-19 12+ (2023-24) (MODERNA) 11/04/2023    COVID-19 Bivalent 18+ (Moderna) 11/21/2022    COVID-19 Monovalent 18+ (Moderna) 03/10/2021, 04/07/2021, 11/17/2021,  06/20/2022    Influenza Vaccine 18-64 (Flublok) 10/03/2020    Influenza Vaccine 65+ (Fluzone HD) 11/17/2021, 11/21/2022, 11/04/2023    TDAP (Adacel,Boostrix) 03/20/2008   Will go his pharmacy to get all his vaccines           - Colon CA screen: Colonoscopy, age 45-75 every 10 years or FIT every year or Cologuard every 3 years   Scheduled 01/03/2025       - Prostate CA screen: Discussed controversy about screening.   PSA   Date Value Ref Range Status   03/28/2017 0.44 0 - 4 ug/L Final     Comment:     Assay Method:  Chemiluminescence using Siemens Vista analyzer       -lipids screen: ordered     Diabetes screen: ordered               9/5/2024   General Health   How would you rate your overall physical health? Excellent   Feel stress (tense, anxious, or unable to sleep) Not at all            9/5/2024   Nutrition   Diet: Regular (no restrictions)            9/5/2024   Exercise   Days per week of moderate/strenous exercise 4 days            9/5/2024   Social Factors   Frequency of gathering with friends or relatives Once a week   Worry food won't last until get money to buy more No   Food not last or not have enough money for food? No   Do you have housing? (Housing is defined as stable permanent housing and does not include staying ouside in a car, in a tent, in an abandoned building, in an overnight shelter, or couch-surfing.) Yes   Are you worried about losing your housing? No   Lack of transportation? No   Unable to get utilities (heat,electricity)? No            9/5/2024   Fall Risk   Fallen 2 or more times in the past year? No   Trouble with walking or balance? No             9/5/2024   Activities of Daily Living- Home Safety   Needs help with the following daily activites None of the above   Safety concerns in the home None of the above            9/5/2024   Dental   Dentist two times every year? Yes            9/5/2024   Hearing Screening   Hearing concerns? None of the above            9/5/2024   Driving Risk  Screening   Patient/family members have concerns about driving No            9/5/2024   General Alertness/Fatigue Screening   Have you been more tired than usual lately? No            9/5/2024   Urinary Incontinence Screening   Bothered by leaking urine in past 6 months No            9/5/2024   TB Screening   Were you born outside of the [place]? No            Today's PHQ-2 Score:       9/5/2024     9:08 AM   PHQ-2 ( 1999 Pf[employer])   Q1: Little interest or pleasure in doing things 0   Q2: Feeling down, depressed or hopeless 0   PHQ-2 Score 0   Q1: Little interest or pleasure in doing things Not at all   Q2: Feeling down, depressed or hopeless Not at all   PHQ-2 Score 0           9/5/2024   Substance Use   Alcohol more than 3/day or more than 7/wk Not Applicable   Do you have a current opioid prescription? No   How severe/bad is pain from 1 to 10? 0/10 (No Pain)   Do you use any other substances recreationally? No        Social History     Tobacco Use    Smoking status: Never    Smokeless tobacco: Never   Vaping Use    Vaping status: Never Used   Substance Use Topics    Alcohol use: No    Drug use: No           9/5/2024   AAA Screening   Family history of Abdominal Aortic Aneurysm (AAA)? No      ASCVD Risk   The ASCVD Risk score (Glenn DK, et al., 2019) failed to calculate for the following reasons:    Cannot find a previous HDL lab    Cannot find a previous total cholesterol lab            Reviewed and updated as needed this visit by Provider                    Past Medical History:   Diagnosis Date    Carotid stenosis     TIA (transient ischemic attack)      Past Surgical History:   Procedure Laterality Date    BACK SURGERY  1976    cyst on spine     CAROTID ENDARTERECTOMY      COLONOSCOPY      COLONOSCOPY WITH CO2 INSUFFLATION N/A 03/29/2016    Procedure: COLONOSCOPY WITH CO2 INSUFFLATION;  Surgeon: Jose Alberto Christy MD;  Location: [facility] OR    COLONOSCOPY WITH CO2 INSUFFLATION N/A 04/17/2017    Procedure:  COLONOSCOPY WITH CO2 INSUFFLATION;  COLON SCREEN/ COLLIER;  Surgeon: Jasen Hudson DO;  Location: [facility] OR    GENITOURINARY SURGERY      undescened testicle    HEMORRHOIDECTOMY  1996     Lab work is in process  BP Readings from Last 3 Encounters:   09/05/24 (!) 165/85   07/05/24 (!) 162/94   04/17/17 121/84    Wt Readings from Last 3 Encounters:   09/05/24 94.3 kg (208 lb)   07/05/24 96.6 kg (213 lb)   04/10/17 86.2 kg (190 lb)                  Patient Active Problem List   Diagnosis    CARDIOVASCULAR SCREENING; LDL GOAL LESS THAN 160    Sessile colonic polyp     Past Surgical History:   Procedure Laterality Date    BACK SURGERY  1976    cyst on spine     CAROTID ENDARTERECTOMY      COLONOSCOPY      COLONOSCOPY WITH CO2 INSUFFLATION N/A 03/29/2016    Procedure: COLONOSCOPY WITH CO2 INSUFFLATION;  Surgeon: Jose Alberto Christy MD;  Location: [facility] OR    COLONOSCOPY WITH CO2 INSUFFLATION N/A 04/17/2017    Procedure: COLONOSCOPY WITH CO2 INSUFFLATION;  COLON SCREEN/ COLLIER;  Surgeon: Jasen Hudson DO;  Location: [facility] OR    GENITOURINARY SURGERY      undescened testicle    HEMORRHOIDECTOMY  1996       Social History     Tobacco Use    Smoking status: Never    Smokeless tobacco: Never   Substance Use Topics    Alcohol use: No     Family History   Problem Relation Age of Onset    Alzheimer Disease Mother     Alcohol/Drug Father     Cerebrovascular Disease Father     Cerebrovascular Disease Paternal Grandfather          Current Outpatient Medications   Medication Sig Dispense Refill    amLODIPine (NORVASC) 10 MG tablet Take 1 tablet (10 mg) by mouth daily 90 tablet 1    Ascorbic Acid (VITAMIN C PO)       aspirin 81 MG tablet Take by mouth daily      atorvastatin (LIPITOR) 40 MG tablet Take 40 mg by mouth daily      Cholecalciferol (VITAMIN D3 PO) Take by mouth daily      Ferrous Fumarate 325 (106 Fe) MG TABS Take 325 mg by mouth      vitamin  B complex with vitamin C (VITAMIN  B COMPLEX) TABS Take 1 tablet by mouth  "daily      VITAMIN A PO       Vitamin E (VITAMIN E/D-ALPHA NATURAL) 268 MG (400 UNIT) CAPS Take 400 Units by mouth      Zinc Acetate, Oral, 50 MG CAPS Take 50 mg by mouth       Allergies   Allergen Reactions    Sulfa Antibiotics      No lab results found.   Current providers sharing in care for this patient include:  Patient Care Team:  Dacia Paredes MD as PCP - General (Family Medicine)  Dacia Paredes MD as Assigned PCP    The following health maintenance items are reviewed in Epic and correct as of today:  Health Maintenance   Topic Date Due    ANNUAL REVIEW OF HM ORDERS  Never done    ZOSTER IMMUNIZATION (1 of 2) Never done    RSV VACCINE (1 - 1-dose 60+ series) Never done    LIPID  03/09/2017    DTAP/TDAP/TD IMMUNIZATION (2 - Td or Tdap) 03/20/2018    Pneumococcal Vaccine: 65+ Years (1 of 1 - PCV) Never done    COLORECTAL CANCER SCREENING  04/17/2018    GLUCOSE  03/09/2019    ADVANCE CARE PLANNING  07/23/2019    INFLUENZA VACCINE (1) 09/01/2024    COVID-19 Vaccine (7 - 2023-24 season) 09/01/2024    MEDICARE ANNUAL WELLNESS VISIT  09/05/2025    FALL RISK ASSESSMENT  09/05/2025    HEPATITIS C SCREENING  Completed    PHQ-2 (once per calendar year)  Completed    HPV IMMUNIZATION  Aged Out    MENINGITIS IMMUNIZATION  Aged Out    RSV MONOCLONAL ANTIBODY  Aged Out         Review of Systems  Constitutional, HEENT, cardiovascular, pulmonary, gi and gu systems are negative, except as otherwise noted.     Objective    Exam  BP (!) 182/104   Pulse 73   Temp 97  F (36.1  C) (Tympanic)   Resp 16   Ht 1.727 m (5' 8\")   Wt 94.3 kg (208 lb)   SpO2 97%   BMI 31.63 kg/m     Estimated body mass index is 31.63 kg/m  as calculated from the following:    Height as of this encounter: 1.727 m (5' 8\").    Weight as of this encounter: 94.3 kg (208 lb).    Physical Exam  GENERAL: alert and no distress  EYES: Eyes grossly normal to inspection, PERRL and conjunctivae and sclerae normal  HENT: ear canals and TM's normal, nose and " mouth without ulcers or lesions  NECK: no adenopathy, no asymmetry, masses, or scars  RESP: lungs clear to auscultation - no rales, rhonchi or wheezes  CV: regular rate and rhythm, normal S1 S2, no S3 or S4, no murmur, click or rub, no peripheral edema  ABDOMEN: soft, nontender, no hepatosplenomegaly, no masses and bowel sounds normal  MS: no gross musculoskeletal defects noted, no edema  SKIN: no suspicious lesions or rashes  NEURO: Normal strength and tone, mentation intact and speech normal  PSYCH: mentation appears normal, affect normal/bright         9/5/2024   Mini Cog   Clock Draw Score 2 Normal   3 Item Recall 3 objects recalled   Mini Cog Total Score 5                 Signed Electronically by: Dacia Paredes MD

## 2024-10-17 ENCOUNTER — OFFICE VISIT (OUTPATIENT)
Dept: FAMILY MEDICINE | Facility: CLINIC | Age: 69
End: 2024-10-17
Payer: COMMERCIAL

## 2024-10-17 VITALS
BODY MASS INDEX: 32.13 KG/M2 | WEIGHT: 212 LBS | HEART RATE: 76 BPM | OXYGEN SATURATION: 96 % | DIASTOLIC BLOOD PRESSURE: 78 MMHG | SYSTOLIC BLOOD PRESSURE: 144 MMHG | HEIGHT: 68 IN | TEMPERATURE: 97 F | RESPIRATION RATE: 20 BRPM

## 2024-10-17 DIAGNOSIS — E78.5 HYPERLIPIDEMIA LDL GOAL <100: ICD-10-CM

## 2024-10-17 DIAGNOSIS — I10 BENIGN ESSENTIAL HYPERTENSION: Primary | ICD-10-CM

## 2024-10-17 PROCEDURE — G2211 COMPLEX E/M VISIT ADD ON: HCPCS | Performed by: FAMILY MEDICINE

## 2024-10-17 PROCEDURE — 99214 OFFICE O/P EST MOD 30 MIN: CPT | Performed by: FAMILY MEDICINE

## 2024-10-17 PROCEDURE — G0009 ADMIN PNEUMOCOCCAL VACCINE: HCPCS | Performed by: FAMILY MEDICINE

## 2024-10-17 PROCEDURE — 90677 PCV20 VACCINE IM: CPT | Performed by: FAMILY MEDICINE

## 2024-10-17 RX ORDER — LISINOPRIL AND HYDROCHLOROTHIAZIDE 10; 12.5 MG/1; MG/1
1 TABLET ORAL DAILY
Qty: 90 TABLET | Refills: 3 | Status: SHIPPED | OUTPATIENT
Start: 2024-10-17

## 2024-10-17 RX ORDER — AMLODIPINE BESYLATE 10 MG/1
10 TABLET ORAL DAILY
Qty: 90 TABLET | Refills: 3 | Status: SHIPPED | OUTPATIENT
Start: 2024-10-17

## 2024-10-17 RX ORDER — ATORVASTATIN CALCIUM 40 MG/1
40 TABLET, FILM COATED ORAL DAILY
Qty: 90 TABLET | Refills: 3 | Status: SHIPPED | OUTPATIENT
Start: 2024-10-17

## 2024-10-17 ASSESSMENT — PAIN SCALES - GENERAL: PAINLEVEL: NO PAIN (0)

## 2024-10-17 NOTE — PATIENT INSTRUCTIONS
Please check blood pressure at home     >Buy a blood pressure cuff and monitor your blood pressure at home.     >When checking your blood pressure at home:    upper arm cuff is preferred versus wrist cuff due to accuracy  Sit in a chair  Rest for 5 minutes  Avoid alcohol, nicotine, caffeine, exercise, food or drink 30 minutes prior  urinate prior to checking if needed  Elbow is at about heart level  Feet flat on the floor, no crossing legs or feet  No talking, minimal movement   Place cuff on bare skin

## 2024-10-17 NOTE — PROGRESS NOTES
"  Assessment & Plan     Benign essential hypertension  Patient with a longstanding history of hypertension.  He had a TIA in June 2024.  Has been on Norvasc 10 mg.  Blood pressure is not well-controlled.  I recommend adding lisinopril-hydrochlorothiazide.  Recommended low-salt diet.  Follow-up in 2 weeks for ancillary blood pressure, and 3 months office visit.  - lisinopril-hydrochlorothiazide (ZESTORETIC) 10-12.5 MG tablet; Take 1 tablet by mouth daily.  - amLODIPine (NORVASC) 10 MG tablet; Take 1 tablet (10 mg) by mouth daily.    Hyperlipidemia LDL goal <100  Chronic stable problem.  Continue Lipitor 40 mg, refill provided.  - atorvastatin (LIPITOR) 40 MG tablet; Take 1 tablet (40 mg) by mouth daily.              Work on weight loss  Regular exercise        The longitudinal plan of care for the diagnosis(es)/condition(s) as documented were addressed during this visit. Due to the added complexity in care, I will continue to support Bari in the subsequent management and with ongoing continuity of care.      Subjective   Bari is a 69 year old, presenting for the following health issues:  Hypertension        10/17/2024     3:44 PM   Additional Questions   Roomed by Kanwal   Accompanied by self     History of Present Illness       Hypertension: He presents for follow up of hypertension.  He does check blood pressure  regularly outside of the clinic. Outside blood pressures have been over 140/90. He does not follow a low salt diet. He consumes 0 sweetened beverage(s) daily.He exercises with enough effort to increase his heart rate 20 to 29 minutes per day.  He exercises with enough effort to increase his heart rate 4 days per week.   He is taking medications regularly.                 Review of Systems  Constitutional, HEENT, cardiovascular, pulmonary, gi and gu systems are negative, except as otherwise noted.      Objective    BP (!) 144/78   Pulse 76   Temp 97  F (36.1  C) (Tympanic)   Resp 20   Ht 1.727 m (5' 8\")   " Wt 96.2 kg (212 lb)   SpO2 96%   BMI 32.23 kg/m    Body mass index is 32.23 kg/m .  Physical Exam   GENERAL: alert and no distress  NECK: no adenopathy, no asymmetry, masses, or scars  RESP: lungs clear to auscultation - no rales, rhonchi or wheezes  CV: regular rate and rhythm, normal S1 S2, no S3 or S4, no murmur, click or rub, no peripheral edema  ABDOMEN: soft, nontender, no hepatosplenomegaly, no masses and bowel sounds normal  MS: no gross musculoskeletal defects noted, no edema            Signed Electronically by: Dacia Paredes MD

## 2024-10-31 ENCOUNTER — ALLIED HEALTH/NURSE VISIT (OUTPATIENT)
Dept: FAMILY MEDICINE | Facility: CLINIC | Age: 69
End: 2024-10-31
Payer: COMMERCIAL

## 2024-10-31 VITALS — SYSTOLIC BLOOD PRESSURE: 134 MMHG | DIASTOLIC BLOOD PRESSURE: 86 MMHG

## 2024-10-31 DIAGNOSIS — I10 BENIGN ESSENTIAL HYPERTENSION: Primary | ICD-10-CM

## 2024-10-31 PROCEDURE — 99207 PR NO CHARGE NURSE ONLY: CPT

## 2024-10-31 NOTE — PROGRESS NOTES
I met with Gregory Beckham at the request of Dr Paredes to recheck his blood pressure.  Blood pressure medications on the med list were reviewed with patient.    Patient has taken all medications as per usual regimen: Yes  Patient reports tolerating them without any issues or concerns: Yes    Vitals:    10/31/24 0958   BP: 134/86       Blood pressure was taken, previous encounter was reviewed, recorded blood pressure below 140/90.  Patient was discharged and the note will be sent to the provider for final review.    Noa Abrams, CMA

## 2024-11-04 ENCOUNTER — MYC MEDICAL ADVICE (OUTPATIENT)
Dept: FAMILY MEDICINE | Facility: CLINIC | Age: 69
End: 2024-11-04
Payer: COMMERCIAL

## 2024-11-04 ENCOUNTER — PATIENT OUTREACH (OUTPATIENT)
Dept: FAMILY MEDICINE | Facility: CLINIC | Age: 69
End: 2024-11-04
Payer: COMMERCIAL

## 2024-11-04 NOTE — TELEPHONE ENCOUNTER
Patient Quality Outreach    Patient is due for the following:   Colon Cancer Screening      Topic Date Due    Zoster (Shingles) Vaccine (1 of 2) Never done    Diptheria Tetanus Pertussis (DTAP/TDAP/TD) Vaccine (2 - Td or Tdap) 03/20/2018       Next Steps:   Call & schedule colonoscopy    Type of outreach:    Sent [employer] message.      Questions for provider review:    None           Noa Abrams, CMA

## 2025-01-16 ENCOUNTER — OFFICE VISIT (OUTPATIENT)
Dept: FAMILY MEDICINE | Facility: CLINIC | Age: 70
End: 2025-01-16
Payer: COMMERCIAL

## 2025-01-16 VITALS
RESPIRATION RATE: 24 BRPM | TEMPERATURE: 96.6 F | SYSTOLIC BLOOD PRESSURE: 138 MMHG | HEIGHT: 68 IN | HEART RATE: 60 BPM | WEIGHT: 203 LBS | DIASTOLIC BLOOD PRESSURE: 84 MMHG | BODY MASS INDEX: 30.77 KG/M2 | OXYGEN SATURATION: 99 %

## 2025-01-16 DIAGNOSIS — E78.5 HYPERLIPIDEMIA LDL GOAL <100: ICD-10-CM

## 2025-01-16 DIAGNOSIS — Z86.73 HISTORY OF TIA (TRANSIENT ISCHEMIC ATTACK) AND STROKE: ICD-10-CM

## 2025-01-16 DIAGNOSIS — Z13.1 SCREENING FOR DIABETES MELLITUS: ICD-10-CM

## 2025-01-16 DIAGNOSIS — I10 BENIGN ESSENTIAL HYPERTENSION: Primary | ICD-10-CM

## 2025-01-16 LAB
ALBUMIN SERPL BCG-MCNC: 4.3 G/DL (ref 3.5–5.2)
ALP SERPL-CCNC: 69 U/L (ref 40–150)
ALT SERPL W P-5'-P-CCNC: 29 U/L (ref 0–70)
ANION GAP SERPL CALCULATED.3IONS-SCNC: 10 MMOL/L (ref 7–15)
AST SERPL W P-5'-P-CCNC: 25 U/L (ref 0–45)
BILIRUB SERPL-MCNC: 0.7 MG/DL
BUN SERPL-MCNC: 22.3 MG/DL (ref 8–23)
CALCIUM SERPL-MCNC: 9.7 MG/DL (ref 8.8–10.4)
CHLORIDE SERPL-SCNC: 105 MMOL/L (ref 98–107)
CREAT SERPL-MCNC: 0.97 MG/DL (ref 0.67–1.17)
EGFRCR SERPLBLD CKD-EPI 2021: 83 ML/MIN/1.73M2
EST. AVERAGE GLUCOSE BLD GHB EST-MCNC: 114 MG/DL
GLUCOSE SERPL-MCNC: 112 MG/DL (ref 70–99)
HBA1C MFR BLD: 5.6 % (ref 0–5.6)
HCO3 SERPL-SCNC: 28 MMOL/L (ref 22–29)
POTASSIUM SERPL-SCNC: 4.2 MMOL/L (ref 3.4–5.3)
PROT SERPL-MCNC: 6.8 G/DL (ref 6.4–8.3)
SODIUM SERPL-SCNC: 143 MMOL/L (ref 135–145)

## 2025-01-16 ASSESSMENT — PAIN SCALES - GENERAL: PAINLEVEL_OUTOF10: NO PAIN (0)

## 2025-01-16 NOTE — PROGRESS NOTES
"  Assessment & Plan     Benign essential hypertension  Longstanding history of hypertension.  Blood pressure has been controlled with new changes.  He has been taking Norvasc 10 mg, and lisinopril/hydrochlorothiazide, tolerating medications well without side effects.  Continue the same treatment.  Check CMP today.  - Comprehensive metabolic panel (BMP + Alb, Alk Phos, ALT, AST, Total. Bili, TP); Future  - Comprehensive metabolic panel (BMP + Alb, Alk Phos, ALT, AST, Total. Bili, TP)    Hyperlipidemia LDL goal <100  Chronic stable problem.  Last LDL 57, at goal.  Currently taking Lipitor 40 mg, tolerating medication well without side effects  Continue Lipitor 40 mg  Screening for diabetes mellitus    - Hemoglobin A1c; Future  - Hemoglobin A1c    History of TIA (transient ischemic attack) and stroke  History of TIA, status post right carotid endarterectomy.  Denies any current symptoms of dizziness.  Currently taking Lipitor and aspirin  Continue Lipitor and aspirin.        The longitudinal plan of care for the diagnosis(es)/condition(s) as documented were addressed during this visit. Due to the added complexity in care, I will continue to support Bari in the subsequent management and with ongoing continuity of care.      BMI  Estimated body mass index is 30.87 kg/m  as calculated from the following:    Height as of this encounter: 1.727 m (5' 8\").    Weight as of this encounter: 92.1 kg (203 lb).   Weight management plan: Discussed healthy diet and exercise guidelines      Work on weight loss  Regular exercise    Subjective   Bari is a 69 year old, presenting for the following health issues:  Consult (Follow up)        1/16/2025     9:07 AM   Additional Questions   Roomed by Kanwal   Accompanied by self     History of Present Illness       Hypertension: He presents for follow up of hypertension.  He does check blood pressure  regularly outside of the clinic. Outpatient blood pressures have not been over 140/90. He does " "not follow a low salt diet.     He eats 2-3 servings of fruits and vegetables daily.He consumes 0 sweetened beverage(s) daily.He exercises with enough effort to increase his heart rate 30 to 60 minutes per day.  He exercises with enough effort to increase his heart rate 3 or less days per week.   He is taking medications regularly.                 Review of Systems  Constitutional, HEENT, cardiovascular, pulmonary, gi and gu systems are negative, except as otherwise noted.      Objective    /84   Pulse 60   Temp (!) 96.6  F (35.9  C) (Tympanic)   Resp 24   Ht 1.727 m (5' 8\")   Wt 92.1 kg (203 lb)   SpO2 99%   BMI 30.87 kg/m    Body mass index is 30.87 kg/m .  Physical Exam  Vitals and nursing note reviewed.   Constitutional:       General: He is not in acute distress.     Appearance: Normal appearance. He is not ill-appearing, toxic-appearing or diaphoretic.   HENT:      Head: Normocephalic and atraumatic.   Neurological:      Mental Status: He is alert.                    Signed Electronically by: Dacia Paredes MD    "

## 2025-02-12 ENCOUNTER — TELEPHONE (OUTPATIENT)
Dept: GASTROENTEROLOGY | Facility: CLINIC | Age: 70
End: 2025-02-12
Payer: COMMERCIAL

## 2025-02-12 NOTE — TELEPHONE ENCOUNTER
Rescheduled Colonoscopy  Due to needing MAC sedation.      Pre visit planning completed.      Procedure details:    Patient scheduled for Colonoscopy on 2/26/25.     Arrival time: 0800. Procedure time 0900    Facility location: [facility]; [address] [place] [address] , [place] [address]. Check in location: Main entrance at Surgery registation desk.    Sedation type: MAC    Pre op exam needed? No.    Indication for procedure: Screening      Chart review:     Electronic implanted devices? No    Recent diagnosis of diverticulitis within the last 6 weeks? No      Medication review:    Diabetic? No    Anticoagulants? No    Weight loss medication/injectable? No GLP-1 medication per patient's medication list. Nursing to verify with pre-assessment call.    Other medication HOLDING recommendations:  Ferrous sulfate (iron supplements): HOLD 7 days before procedure.      Prep for procedure:     Bowel prep recommendation: Standard Miralax.   Due to: standard bowel prep    Procedure information and instructions sent via mally Feliciano LPN  Endoscopy Procedure Pre Assessment   [phone] option 2

## 2025-02-12 NOTE — TELEPHONE ENCOUNTER
Pre assessment completed for upcoming procedure.   (Please see previous telephone encounter notes for complete details)      Procedure details:    Arrival time and facility location reviewed.    Pre op exam needed? No.    Designated  policy reviewed. Instructed to have someone stay 24  hours post procedure.       Medication review:    Medications reviewed. Please see supporting documentation below. Holding recommendations discussed (if applicable).   Ferrous Sulfate (iron supplement): HOLD 7 days before procedure.      Prep for procedure:     Procedure prep instructions reviewed.        Any additional information needed:  N/A      Patient verbalized understanding and had no questions or concerns at this time.      Hetal Feliciano LPN  Endoscopy Procedure Pre Assessment   [phone] option 2

## 2025-02-25 NOTE — H&P
[facility] Anesthesia Pre-op History and Physical    Gregory Beckham MRN# [id number]   Age: 69 year old YOB: 1955      Date of Surgery: 2/26/2025 Location [facility]      Date of Exam 2/26/2025 Facility (In hospital)       Home clinic: [facility]  Primary care provider: Dacia Paredes         Chief Complaint and/or Reason for Procedure:   No chief complaint on file.  Colonoscopy  2017 advanced adenoma       Active problem list:     Patient Active Problem List    Diagnosis Date Noted    Sessile colonic polyp 03/28/2017     Priority: Medium    CARDIOVASCULAR SCREENING; LDL GOAL LESS THAN 160 07/29/2014     Priority: Medium            Medications (include herbals and vitamins):   Any Plavix use in the last 7 days? No     No current facility-administered medications for this encounter.     Current Outpatient Medications   Medication Sig Dispense Refill    amLODIPine (NORVASC) 10 MG tablet Take 1 tablet (10 mg) by mouth daily. 90 tablet 3    Ascorbic Acid (VITAMIN C PO)       aspirin 81 MG tablet Take by mouth daily      atorvastatin (LIPITOR) 40 MG tablet Take 1 tablet (40 mg) by mouth daily. 90 tablet 3    Cholecalciferol (VITAMIN D3 PO) Take by mouth daily      Ferrous Fumarate 325 (106 Fe) MG TABS Take 325 mg by mouth      lisinopril-hydrochlorothiazide (ZESTORETIC) 10-12.5 MG tablet Take 1 tablet by mouth daily. 90 tablet 3    vitamin  B complex with vitamin C (VITAMIN  B COMPLEX) TABS Take 1 tablet by mouth daily      VITAMIN A PO       Vitamin E (VITAMIN E/D-ALPHA NATURAL) 268 MG (400 UNIT) CAPS Take 400 Units by mouth      Zinc Acetate, Oral, 50 MG CAPS Take 50 mg by mouth               Allergies:      Allergies   Allergen Reactions    Sulfa Antibiotics      Allergy to Latex? No  Allergy to tape?   No  Intolerances:             Physical Exam:   All vitals have been reviewed  No data found.  No intake/output data recorded.  Lungs:   No  increased work of breathing, good air exchange, clear to auscultation bilaterally, no crackles or wheezing     Cardiovascular:   Normal apical impulse, regular rate and rhythm, normal S1 and S2, no S3 or S4, and no murmur noted             Lab / Radiology Results:            Anesthetic risk and/or ASA classification:       Blade De Luna MD

## 2025-02-26 ENCOUNTER — ANESTHESIA EVENT (OUTPATIENT)
Dept: GASTROENTEROLOGY | Facility: CLINIC | Age: 70
End: 2025-02-26
Payer: COMMERCIAL

## 2025-02-26 ENCOUNTER — ANESTHESIA (OUTPATIENT)
Dept: GASTROENTEROLOGY | Facility: CLINIC | Age: 70
End: 2025-02-26
Payer: COMMERCIAL

## 2025-02-26 ENCOUNTER — HOSPITAL ENCOUNTER (OUTPATIENT)
Facility: CLINIC | Age: 70
Discharge: HOME | End: 2025-02-26
Attending: INTERNAL MEDICINE | Admitting: INTERNAL MEDICINE
Payer: COMMERCIAL

## 2025-02-26 VITALS
TEMPERATURE: 97.6 F | HEART RATE: 52 BPM | SYSTOLIC BLOOD PRESSURE: 121 MMHG | RESPIRATION RATE: 16 BRPM | OXYGEN SATURATION: 97 % | DIASTOLIC BLOOD PRESSURE: 67 MMHG

## 2025-02-26 LAB — COLONOSCOPY: NORMAL

## 2025-02-26 PROCEDURE — 45385 COLONOSCOPY W/LESION REMOVAL: CPT | Performed by: INTERNAL MEDICINE

## 2025-02-26 PROCEDURE — 250N000009 HC RX 250: Performed by: NURSE ANESTHETIST, CERTIFIED REGISTERED

## 2025-02-26 PROCEDURE — 88305 TISSUE EXAM BY PATHOLOGIST: CPT | Mod: TC | Performed by: INTERNAL MEDICINE

## 2025-02-26 PROCEDURE — 45380 COLONOSCOPY AND BIOPSY: CPT | Performed by: INTERNAL MEDICINE

## 2025-02-26 PROCEDURE — 370N000017 HC ANESTHESIA TECHNICAL FEE, PER MIN: Performed by: INTERNAL MEDICINE

## 2025-02-26 PROCEDURE — 250N000011 HC RX IP 250 OP 636: Performed by: NURSE ANESTHETIST, CERTIFIED REGISTERED

## 2025-02-26 PROCEDURE — 258N000003 HC RX IP 258 OP 636: Performed by: NURSE ANESTHETIST, CERTIFIED REGISTERED

## 2025-02-26 PROCEDURE — 88305 TISSUE EXAM BY PATHOLOGIST: CPT | Mod: 26 | Performed by: PATHOLOGY

## 2025-02-26 RX ORDER — DEXAMETHASONE SODIUM PHOSPHATE 10 MG/ML
4 INJECTION, SOLUTION INTRAMUSCULAR; INTRAVENOUS
Status: CANCELLED | OUTPATIENT
Start: 2025-02-26

## 2025-02-26 RX ORDER — PROPOFOL 10 MG/ML
INJECTION, EMULSION INTRAVENOUS CONTINUOUS PRN
Status: DISCONTINUED | OUTPATIENT
Start: 2025-02-26 | End: 2025-02-26

## 2025-02-26 RX ORDER — SODIUM CHLORIDE, SODIUM LACTATE, POTASSIUM CHLORIDE, CALCIUM CHLORIDE 600; 310; 30; 20 MG/100ML; MG/100ML; MG/100ML; MG/100ML
INJECTION, SOLUTION INTRAVENOUS CONTINUOUS
Status: DISCONTINUED | OUTPATIENT
Start: 2025-02-26 | End: 2025-02-26 | Stop reason: HOSPADM

## 2025-02-26 RX ORDER — ONDANSETRON 2 MG/ML
4 INJECTION INTRAMUSCULAR; INTRAVENOUS EVERY 30 MIN PRN
Status: CANCELLED | OUTPATIENT
Start: 2025-02-26

## 2025-02-26 RX ORDER — ONDANSETRON 4 MG/1
4 TABLET, ORALLY DISINTEGRATING ORAL EVERY 30 MIN PRN
Status: CANCELLED | OUTPATIENT
Start: 2025-02-26

## 2025-02-26 RX ORDER — PROPOFOL 10 MG/ML
INJECTION, EMULSION INTRAVENOUS PRN
Status: DISCONTINUED | OUTPATIENT
Start: 2025-02-26 | End: 2025-02-26

## 2025-02-26 RX ORDER — LIDOCAINE HYDROCHLORIDE 20 MG/ML
INJECTION, SOLUTION INFILTRATION; PERINEURAL PRN
Status: DISCONTINUED | OUTPATIENT
Start: 2025-02-26 | End: 2025-02-26

## 2025-02-26 RX ADMIN — PROPOFOL 60 MG: 10 INJECTION, EMULSION INTRAVENOUS at 08:40

## 2025-02-26 RX ADMIN — SODIUM CHLORIDE, SODIUM LACTATE, POTASSIUM CHLORIDE, AND CALCIUM CHLORIDE: .6; .31; .03; .02 INJECTION, SOLUTION INTRAVENOUS at 08:22

## 2025-02-26 RX ADMIN — LIDOCAINE HYDROCHLORIDE 50 MG: 20 INJECTION, SOLUTION INFILTRATION; PERINEURAL at 08:39

## 2025-02-26 RX ADMIN — PROPOFOL 150 MCG/KG/MIN: 10 INJECTION, EMULSION INTRAVENOUS at 08:40

## 2025-02-26 RX ADMIN — PROPOFOL 30 MG: 10 INJECTION, EMULSION INTRAVENOUS at 08:44

## 2025-02-26 ASSESSMENT — ACTIVITIES OF DAILY LIVING (ADL)
ADLS_ACUITY_SCORE: 41

## 2025-02-26 NOTE — ANESTHESIA PREPROCEDURE EVALUATION
Anesthesia Pre-Procedure Evaluation    Patient: Gregory Beckham   MRN: [id number] : 1955        Procedure : Procedure(s):  Colonoscopy          Past Medical History:   Diagnosis Date    Carotid stenosis     TIA (transient ischemic attack)       Past Surgical History:   Procedure Laterality Date    BACK SURGERY      cyst on spine     CAROTID ENDARTERECTOMY      COLONOSCOPY      COLONOSCOPY WITH CO2 INSUFFLATION N/A 2016    Procedure: COLONOSCOPY WITH CO2 INSUFFLATION;  Surgeon: Jose Alberto Christy MD;  Location: [facility] OR    COLONOSCOPY WITH CO2 INSUFFLATION N/A 2017    Procedure: COLONOSCOPY WITH CO2 INSUFFLATION;  COLON SCREEN/ COLLIER;  Surgeon: Jasen Hudson DO;  Location: [facility] OR    GENITOURINARY SURGERY      undescened testicle    HEMORRHOIDECTOMY        Allergies   Allergen Reactions    Sulfa Antibiotics       Social History     Tobacco Use    Smoking status: Never    Smokeless tobacco: Never   Substance Use Topics    Alcohol use: No      Wt Readings from Last 1 Encounters:   25 92.1 kg (203 lb)        Anesthesia Evaluation   Pt has had prior anesthetic. Type: General and MAC.        ROS/MED HX  ENT/Pulmonary:  - neg pulmonary ROS     Neurologic:     (+)              TIA,                  Cardiovascular:     (+)  - Peripheral Vascular Disease-- Carotid Stenosis.   -  - -                                      METS/Exercise Tolerance: >4 METS    Hematologic:  - neg hematologic  ROS     Musculoskeletal:  - neg musculoskeletal ROS     GI/Hepatic:     (+)        bowel prep,            Renal/Genitourinary:  - neg Renal ROS     Endo:  - neg endo ROS     Psychiatric/Substance Use:  - neg psychiatric ROS     Infectious Disease:  - neg infectious disease ROS     Malignancy:  - neg malignancy ROS     Other:  - neg other ROS          Physical Exam    Airway  airway exam normal      Mallampati: II   TM distance: > 3 FB   Neck ROM: full   Mouth opening: > 3 cm    Respiratory Devices and  "Support         Dental       (+) Minor Abnormalities - some fillings, tiny chips      Cardiovascular   cardiovascular exam normal       Rhythm and rate: regular and normal     Pulmonary   pulmonary exam normal        breath sounds clear to auscultation           OUTSIDE LABS:  CBC: No results found for: \"WBC\", \"HGB\", \"HCT\", \"PLT\"  BMP:   Lab Results   Component Value Date     01/16/2025     09/05/2024    POTASSIUM 4.2 01/16/2025    POTASSIUM 5.0 09/05/2024    CHLORIDE 105 01/16/2025    CHLORIDE 105 09/05/2024    CO2 28 01/16/2025    CO2 28 09/05/2024    BUN 22.3 01/16/2025    BUN 12.8 09/05/2024    CR 0.97 01/16/2025    CR 1.01 09/05/2024     (H) 01/16/2025     (H) 09/05/2024     COAGS: No results found for: \"PTT\", \"INR\", \"FIBR\"  POC: No results found for: \"BGM\", \"HCG\", \"HCGS\"  HEPATIC:   Lab Results   Component Value Date    ALBUMIN 4.3 01/16/2025    PROTTOTAL 6.8 01/16/2025    ALT 29 01/16/2025    AST 25 01/16/2025    ALKPHOS 69 01/16/2025    BILITOTAL 0.7 01/16/2025     OTHER:   Lab Results   Component Value Date    A1C 5.6 01/16/2025    WILIAM 9.7 01/16/2025       Anesthesia Plan    ASA Status:  2    NPO Status:  NPO Appropriate    Anesthesia Type: MAC.     - Reason for MAC: straight local not clinically adequate   Induction: Propofol.           Consents    Anesthesia Plan(s) and associated risks, benefits, and realistic alternatives discussed. Questions answered and patient/representative(s) expressed understanding.     - Discussed: Risks, Benefits and Alternatives for BOTH SEDATION and the PROCEDURE were discussed     - Discussed with:  Patient      - Extended Intubation/Ventilatory Support Discussed: No.      - Patient is DNR/DNI Status: No     Use of blood products discussed: No .     Postoperative Care    Pain management: IV analgesics.   PONV prophylaxis: Background Propofol Infusion     Comments:               SUJEY Ryan CRNA    I have reviewed the pertinent notes and " labs in the chart from the past 30 days and (re)examined the patient.  Any updates or changes from those notes are reflected in this note.    Clinically Significant Risk Factors Present on Admission

## 2025-02-26 NOTE — ANESTHESIA POSTPROCEDURE EVALUATION
Patient: Gregory Beckham    Procedure: Procedure(s):  COLONOSCOPY, WITH POLYPECTOMY       Anesthesia Type:  MAC    Note:  Disposition: Outpatient   Postop Pain Control: Uneventful            Sign Out: Well controlled pain   PONV: No   Neuro/Psych: Uneventful            Sign Out: Acceptable/Baseline neuro status   Airway/Respiratory: Uneventful            Sign Out: Acceptable/Baseline resp. status   CV/Hemodynamics: Uneventful            Sign Out: Acceptable CV status; No obvious hypovolemia; No obvious fluid overload   Other NRE: NONE   DID A NON-ROUTINE EVENT OCCUR? No           Last vitals:  Vitals Value Taken Time   /67 02/26/25 0940   Temp 97.6  F (36.4  C) 02/26/25 0905   Pulse 52 02/26/25 0940   Resp 16 02/26/25 0940   SpO2 95 % 02/26/25 0925   Vitals shown include unfiled device data.    Electronically Signed By: SUJEY Ryan CRNA  February 26, 2025  9:46 AM

## 2025-02-26 NOTE — LETTER
"March 3, 2025      Bari Beckham  [id number] [address] [facility] [id number]        Dear ,    We are writing to inform you of your test results.    Benign polyp removed. A repeat exam with this and the prep in 3 yrs is suggested.    Resulted Orders   Surgical Pathology Exam   Result Value Ref Range    Case Report       Surgical Pathology Report                         Case: [id number]                                  Authorizing Provider:  Blade De Luna MD        Collected:           02/26/2025 08:45 AM          Ordering Location:     [facility]          Received:            02/26/2025 09:05 AM                                 [facility]                                                          Pathologist:           Vanessa Farfan MD                                                          Specimen:    Large Intestine, Colon, Cecum, Cecal Polyp                                                 Final Diagnosis       A.  Colon, Cecum, polyp, polypectomy:  -Sessile serrated adenoma  -Negative for conventional dysplasia and malignancy        Clinical Information       Procedure:  COLONOSCOPY, WITH POLYPECTOMY  Pre-op Diagnosis: Screen for colon cancer [Z12.11]  Post-op Diagnosis: Z12.11 - Screen for colon cancer [ICD-10-CM]      Gross Description       A(1). Large Intestine, Colon, Cecum, Cecal Polyp:  The specimen is received in formalin, labeled with the patient's name, medical record number and other identifying information designated \"cecal polyp\". It consists of a single, 0.8 cm pale-tan soft tissue fragment (inked black) which is trisected and submitted entirely in 1 cassette.   (EMIL Shen (ASCP) 2/26/2025 2:11 PM       Microscopic Description       Microscopic examination was performed.      Performing Labs       The technical component of this testing was completed at [facility].    Stain controls for all stains " resulted within this report have been reviewed and show appropriate reactivity.       Case Images         If you have any questions or concerns, please call the clinic at the number listed above.       Sincerely,      Blade De Luna MD    Electronically signed

## 2025-02-26 NOTE — ANESTHESIA CARE TRANSFER NOTE
Patient: Gregory Beckham    Procedure: Procedure(s):  COLONOSCOPY, WITH POLYPECTOMY       Diagnosis: Screen for colon cancer [Z12.11]  Diagnosis Additional Information: No value filed.    Anesthesia Type:   MAC     Note:    Oropharynx: oropharynx clear of all foreign objects  Level of Consciousness: drowsy  Oxygen Supplementation: room air    Independent Airway: airway patency satisfactory and stable  Dentition: dentition unchanged  Vital Signs Stable: post-procedure vital signs reviewed and stable  Report to RN Given: handoff report given  Patient transferred to: Phase II    Handoff Report: Identifed the Patient, Identified the Reponsible Provider, Reviewed the pertinent medical history, Discussed the surgical course, Reviewed Intra-OP anesthesia mangement and issues during anesthesia, Set expectations for post-procedure period and Allowed opportunity for questions and acknowledgement of understanding      Vitals:  Vitals Value Taken Time   BP     Temp     Pulse     Resp     SpO2 91 % 02/26/25 0905   Vitals shown include unfiled device data.    Electronically Signed By: SUJEY Ryan CRNA  February 26, 2025  9:06 AM

## 2025-02-26 NOTE — ADDENDUM NOTE
Addendum  created 02/26/25 1002 by Otoniel Elena APRN CRNA    Intraprocedure Event edited, Intraprocedure Staff edited

## 2025-02-26 NOTE — DISCHARGE INSTRUCTIONS
[facility]    Home Care Following Endoscopy    24-HR Nurse Advisor Line [phone]      Activity:  You have just undergone an endoscopic procedure usually performed with conscious sedation.  Do not work or operate machinery (including a car) for at least 12 hours.    I encourage you to walk and attempt to pass this air as soon as possible.    Diet:  Return to the diet you were on before your procedure but eat lightly for the first 12-24 hours.  Drink plenty of water.  Resume any regular medications unless otherwise advised by your physician.  Please begin any new medication prescribed as a result of your procedure as directed by your physician.   If you had any biopsy or polyp removed please refrain from aspirin or aspirin products for 2 days.  If on Coumadin please restart as instructed by your physician.   Pain:  You may take Tylenol as needed for pain.  Expected Recovery:  You can expect some mild abdominal fullness and/or discomfort due to the air used to inflate your intestinal tract. It is also normal to have a mild sore throat after upper endoscopy.    Call Your Physician if You Have:  After Colonoscopy:  Worsening persisting abdominal pain which is worse with activity.  Fevers (>101 degrees F), chills or shakes.  Passage of continued blood with bowel movements.     Any questions or concerns about your recovery, please call [phone] or after hours [phone] ([phone]) Nurse Advice Line.    Follow-up Care:  IF YOU HAD polyps/biopsy tissue sample(s) removed.  The polyps/biopsy tissue sample(s) will be sent to pathology.    You should receive letter in your My Chart from Dr. De Luna with your results within 1-2 weeks. If you do not participate in My Chart a physical letter will come in the mail in 2-3 weeks.  Please call if you have not received a notification of your results.  If asked to return to clinic please make an appointment 1 week after your procedure.  Call [phone].

## 2025-02-27 LAB
PATH REPORT.COMMENTS IMP SPEC: NORMAL
PATH REPORT.COMMENTS IMP SPEC: NORMAL
PATH REPORT.FINAL DX SPEC: NORMAL
PATH REPORT.GROSS SPEC: NORMAL
PATH REPORT.MICROSCOPIC SPEC OTHER STN: NORMAL
PATH REPORT.RELEVANT HX SPEC: NORMAL
PHOTO IMAGE: NORMAL

## 2025-03-08 ENCOUNTER — HEALTH MAINTENANCE LETTER (OUTPATIENT)
Age: 70
End: 2025-03-08

## (undated) DEVICE — KIT ENDO TURNOVER/PROCEDURE CARRY-ON 101822

## (undated) DEVICE — SNARE CAPIVATOR ROUND COLD SNR BX10 M00561101

## (undated) DEVICE — SOL WATER IRRIG 1000ML BOTTLE 2F7114

## (undated) DEVICE — ENDO TRAP POLYP E-TRAP 00711099

## (undated) DEVICE — SOL WATER IRRIG 1000ML BOTTLE 07139-09

## (undated) DEVICE — TUBING SUCTION 6"X3/16" N56A

## (undated) DEVICE — SUCTION MANIFOLD NEPTUNE 2 SYS 1 PORT 702-025-000